# Patient Record
Sex: FEMALE | Race: WHITE | NOT HISPANIC OR LATINO | Employment: UNEMPLOYED | URBAN - METROPOLITAN AREA
[De-identification: names, ages, dates, MRNs, and addresses within clinical notes are randomized per-mention and may not be internally consistent; named-entity substitution may affect disease eponyms.]

---

## 2019-07-29 ENCOUNTER — APPOINTMENT (OUTPATIENT)
Dept: LAB | Facility: HOSPITAL | Age: 54
End: 2019-07-29

## 2019-07-29 ENCOUNTER — OFFICE VISIT (OUTPATIENT)
Dept: ORTHOPEDIC SURGERY | Facility: CLINIC | Age: 54
End: 2019-07-29

## 2019-07-29 VITALS — OXYGEN SATURATION: 98 % | HEIGHT: 67 IN | WEIGHT: 218.26 LBS | HEART RATE: 74 BPM | BODY MASS INDEX: 34.26 KG/M2

## 2019-07-29 DIAGNOSIS — G62.9 NEUROPATHY: ICD-10-CM

## 2019-07-29 DIAGNOSIS — I87.8 VENOUS STASIS: ICD-10-CM

## 2019-07-29 DIAGNOSIS — M79.672 FOOT PAIN, BILATERAL: Primary | ICD-10-CM

## 2019-07-29 DIAGNOSIS — M19.072 ARTHRITIS OF BOTH FEET: ICD-10-CM

## 2019-07-29 DIAGNOSIS — M19.071 ARTHRITIS OF BOTH FEET: ICD-10-CM

## 2019-07-29 DIAGNOSIS — M79.671 FOOT PAIN, BILATERAL: Primary | ICD-10-CM

## 2019-07-29 PROCEDURE — 99204 OFFICE O/P NEW MOD 45 MIN: CPT | Performed by: ORTHOPAEDIC SURGERY

## 2019-07-29 RX ORDER — FLUTICASONE PROPIONATE 50 MCG
SPRAY, SUSPENSION (ML) NASAL
COMMUNITY
Start: 2019-07-25

## 2019-07-29 RX ORDER — LOPERAMIDE HYDROCHLORIDE 2 MG/1
2 CAPSULE ORAL 4 TIMES DAILY PRN
COMMUNITY

## 2019-07-29 RX ORDER — FEXOFENADINE HYDROCHLORIDE 60 MG/1
60 TABLET, FILM COATED ORAL DAILY
COMMUNITY

## 2019-07-29 NOTE — PROGRESS NOTES
NEW PATIENT    Patient: Alysha eRese  : 1965    Primary Care Provider: Frederick Beck MD    Requesting Provider: As above    Pain of the Left Foot and Pain of the Right Foot      History    Chief Complaint: Bilateral foot pain    History of Present Illness: This is an extremely pleasant 54-year-old woman here for a second opinion regarding bilateral foot pain.  She has a history of some pain in the arches of her feet over the past 10 years.  Is gotten worse over the past several years.  She has 2 pair of orthotics, one is semi-custom and the other is custom, both of them have a hard plastic three-quarter length underlay.  She has x-rays from podiatry standing 3 views of both feet dated 2017 that I looked at, the arthritis in the midfoot is mild at that point, predominantly the medial naviculocuneiform joint, she has metatarsus adductus, some hammertoes.  She developed much more pain after going to a Evans[place] [employer] dance 2019 and dancing most of the evening.  The next day she had significant swelling in the foot and the knee, she was on crutches, she was in a boot, she had both anti-inflammatory medication and prednisone.  She had an MRI done that I looked at on 2019, showing significant midfoot abnormality, suggestion was it might be a Charcot joint.  It was treated at an orthopedics, they did not think that it was a Charcot joint.  She gradually was able to return to shoe wear.  She continues to have pain however.  She had an injection in the right foot in what sounds like the fourth and fifth TMT's that did not give her long-term relief.  She is going [place] of [place] and she would like to know if there is anything else I would suggest.  The feet feel worse with activity better with rest.  She does have some nighttime cramping in her calves.  She rates the current pain as 1-4 out of 10, aching and sharp.  She does not have any history of diabetes  that she is aware of, we will try to obtain the labs from Dr. Beck.  She is unaware of any history of neuropathy.    Current Outpatient Medications on File Prior to Visit   Medication Sig Dispense Refill   • ADVAIR DISKUS 250-50 MCG/DOSE DISKUS      • fexofenadine (ALLEGRA) 60 MG tablet Take 60 mg by mouth Daily.     • fluticasone (FLONASE) 50 MCG/ACT nasal spray      • loperamide (IMODIUM) 2 MG capsule Take 2 mg by mouth 4 (Four) Times a Day As Needed for Diarrhea.     • Omega-3 Fatty Acids (OMEGA 3 500 PO) Take  by mouth.       No current facility-administered medications on file prior to visit.       Allergies   Allergen Reactions   • Clindamycin/Lincomycin Rash      Past Medical History:   Diagnosis Date   • Asthma    • Osteoarthritis      Past Surgical History:   Procedure Laterality Date   • LEG SURGERY Left 1992    Vein Stripping      Family History   Problem Relation Age of Onset   • Hypertension Mother    • Stroke Father    • Hypertension Father    • Heart attack Father       Social History     Socioeconomic History   • Marital status:      Spouse name: Not on file   • Number of children: Not on file   • Years of education: Not on file   • Highest education level: Not on file   Tobacco Use   • Smoking status: Never Smoker   • Smokeless tobacco: Never Used   Substance and Sexual Activity   • Alcohol use: Yes     Comment: Socially    • Drug use: No        Review of Systems   Constitutional: Negative.    HENT: Positive for postnasal drip.    Eyes: Negative.    Respiratory: Negative.    Cardiovascular: Negative.    Gastrointestinal: Negative.    Endocrine: Negative.    Genitourinary: Negative.    Musculoskeletal: Positive for arthralgias, back pain, neck pain and neck stiffness.   Skin: Negative.    Allergic/Immunologic: Positive for environmental allergies.   Neurological: Positive for numbness.   Hematological: Bruises/bleeds easily.   Psychiatric/Behavioral: Negative.        The following  "portions of the patient's history were reviewed and updated as appropriate: allergies, current medications, past family history, past medical history, past social history, past surgical history and problem list.    Physical Exam:   Pulse 74   Ht 170.2 cm (67\")   Wt 99 kg (218 lb 4.1 oz)   SpO2 98%   BMI 34.18 kg/m²   GENERAL: Body habitus: obese    Lower extremity edema: Right: 2+ pitting; Leftt: 2+ pitting    Varicose veins:  Right: moderate and venous stasis pigment; Left: moderate and venous stasis pigment    Gait: antalgic     Mental Status:  awake and alert; oriented to person, place, and time    Voice:  clear  SKIN:  venous stasis pigment    Hair Growth:  Right:normal; Left:  normal  NAILS: Toenails: Right great toenail has been removed  HEENT: Head: Normocephalic, atraumatic,  without obvious abnormality.  eye: normal external eye, no icterus  ears: normal external ears  nose: normal external nose  pharynx: dental hygiene adequate  PULM:  Repiratory effort normal  CV:  Dorsalis Pedis:  Right: 2+; Left:2+    Posterior Tibial: Right:2+; Left:2+    Capillary Refill:  Brisk  MSK:  Hand:right handed and mild arthritis, CMC      Tibia:  Right:  tender over subcutaneous border; Left:  tender over subcutaneous border      Ankle:  Right: non tender, ROM  normal and symmetric and motor function  normal; Left:  non tender, ROM  normal and symmetric and motor function  normal      Foot:  Right:  Fairly significant metatarsus adductus, tender across the TMT's and naviculocuneiform joints, prominent osteophytes, moderate splaying of the toes nontender; Left:  Fairly significant metatarsus adductus, tender across the TMT's and naviculocuneiform joints, prominent osteophytes, moderate splaying of the toes nontender      NEURO: Heel Walking:  Right:  normal; Left:  normal    Toe Walking:  Right:  limited ability, painful; Left:  limited ability, painful     [place]-Trace 5.07 monofilament test: patchy decrease    Lower " extremity sensation: diminished     Reflexes:  Biceps:  Right:  1+; Left:  1+           Quads:  Right:  2+; Left:  2+           Ankle:  Right:  1+; Left:  1+      Calf Atrophy:none    Motor Function: all 5/5         Medical Decision Making    Data Review:   ordered and reviewed x-rays today, reviewed prior lab results, reviewed radiology images, reviewed radiology results and reviewed outside records I looked at the outside films and an MRI as above.  We were able to obtain lab studies from Dr. Beck.  On 6/13/2019 her fasting glucose was 88, mildly low hematocrit, normal TSH.  Other labs involved cholesterol etc.  The    Assessment and Plan/ Diagnosis/Treatment options:   1. Arthritis of both feet  She does have significant arthritic change in the midfoot bilaterally, it is a significant progression compared to the x-rays from 2017.  It does make me wonder whether she has had Charcot joints.  The pain and swelling she had in February March of this year, and the right foot, would be consistent with a Charcot joint.  On exam I found she had a patchy decrease to the [place] fiber.  I would recommend that she see a neurologist for evaluation.  We will also send some lab studies today.  With respect to the midfoot arthritis I would recommend new custom orthotics.  We also discussed new shoe wear, her shoes are worn out laterally due to the metatarsus adductus.  For her hiking trip I would suggest that she take Aleve or Advil or the Voltaren with her.  With increased activity and pain I would suggest icing.  I think the orthotics and new shoes will help.  I also showed her how to change the laces in her shoes to avoid pressure over the arthritic joints.  I will see her in 3 months.  Standing 2 views of both feet at that time    2. Neuropathy  As above I am concerned she has an underlying neuropathy.  We will make her an appointment for consultation with neurology.  I will send some screening lab studies  today.  - Hemoglobin A1c; Future  - Rheumatoid Factor; Future  - Sedimentation Rate; Future  - Uric Acid; Future  - C-reactive Protein; Future  - EJ; Future  - Ambulatory Referral to Neurology    3. Venous stasis  She has significant venous stasis bilaterally, that causes increase in pain, it causes cramping and stiffness, I would definitely recommend support stockings daily.  I explained edema and venous stasis to the patient, and the often hereditary nature of the problem, and the contribution of weight, trauma, etc. I explained how they cause edema (due to gravity, etc) I explained how they can lead to ulceration.  I explained how they can cause pain, stiffness, aching, cramping, etc. I explained that it is a very very common problem, so common that even [employer] markets compression stockings.  I recommend wearing support stockings (compression stockings) daily, we discussed what type and where to find them

## 2019-08-19 ENCOUNTER — OFFICE VISIT (OUTPATIENT)
Dept: NEUROLOGY | Facility: CLINIC | Age: 54
End: 2019-08-19

## 2019-08-19 ENCOUNTER — APPOINTMENT (OUTPATIENT)
Dept: LAB | Facility: HOSPITAL | Age: 54
End: 2019-08-19

## 2019-08-19 VITALS
HEART RATE: 68 BPM | DIASTOLIC BLOOD PRESSURE: 90 MMHG | HEIGHT: 67 IN | WEIGHT: 22 LBS | SYSTOLIC BLOOD PRESSURE: 138 MMHG | OXYGEN SATURATION: 96 % | BODY MASS INDEX: 3.45 KG/M2

## 2019-08-19 DIAGNOSIS — M79.672 PAIN IN BOTH FEET: Primary | ICD-10-CM

## 2019-08-19 DIAGNOSIS — M79.671 PAIN IN BOTH FEET: Primary | ICD-10-CM

## 2019-08-19 LAB
FOLATE SERPL-MCNC: >20 NG/ML (ref 4.78–24.2)
VIT B12 BLD-MCNC: 542 PG/ML (ref 211–946)

## 2019-08-19 PROCEDURE — 99203 OFFICE O/P NEW LOW 30 MIN: CPT | Performed by: NURSE PRACTITIONER

## 2019-08-19 PROCEDURE — 82784 ASSAY IGA/IGD/IGG/IGM EACH: CPT | Performed by: NURSE PRACTITIONER

## 2019-08-19 PROCEDURE — 84155 ASSAY OF PROTEIN SERUM: CPT | Performed by: NURSE PRACTITIONER

## 2019-08-19 PROCEDURE — 36415 COLL VENOUS BLD VENIPUNCTURE: CPT | Performed by: NURSE PRACTITIONER

## 2019-08-19 PROCEDURE — 82746 ASSAY OF FOLIC ACID SERUM: CPT | Performed by: NURSE PRACTITIONER

## 2019-08-19 PROCEDURE — 84165 PROTEIN E-PHORESIS SERUM: CPT | Performed by: NURSE PRACTITIONER

## 2019-08-19 PROCEDURE — 83921 ORGANIC ACID SINGLE QUANT: CPT | Performed by: NURSE PRACTITIONER

## 2019-08-19 PROCEDURE — 86334 IMMUNOFIX E-PHORESIS SERUM: CPT | Performed by: NURSE PRACTITIONER

## 2019-08-19 PROCEDURE — 82607 VITAMIN B-12: CPT | Performed by: NURSE PRACTITIONER

## 2019-08-19 RX ORDER — MELOXICAM 15 MG/1
TABLET ORAL
COMMUNITY
Start: 2019-08-02

## 2019-08-19 RX ORDER — CETIRIZINE HYDROCHLORIDE 10 MG/1
10 TABLET ORAL DAILY
COMMUNITY

## 2019-08-19 NOTE — PROGRESS NOTES
"Subjective:     Patient ID: Alysha Reese is a 54 y.o. female.    CC:   Chief Complaint   Patient presents with   • Peripheral Neuropathy       HPI:   History of Present Illness     Ms Reese is a 55 y/o pt here today as a new patient for evaluation of bilateral foot pain.  She was referred by Dr. Decker.  She has a long history of bilateral foot pain dating back about 10 years.  She reports that she was initially diagnosed with plantars fasciitis.  She states that around 8 to 9 years ago her feet went from a size 8-1/2 to a size 10 after her \"arches collapsed\".  She tells me that she has slowly began to notice deviation of her toes outward over several years.  She is been told in the past she has Charcot foot.  She states that she has struggled with significant discomfort in her feet off and on for the past 10 years.  She states that on this past Evans's Day that she danced quite a bit and she has had problems with her feet since.  She reports that for approximately 1 week after Evans's Day both feet were very achy and painful across the top of her foot, after about a week the left foot pain subsided and she has continued pain in the right foot.  She also reports that her right foot seems to swell across the top of her foot.  She complains of sharp pain across the metatarsal heads along the lateral portion of her right foot as well in the peroneal tendon area.   She has x-rays from podiatry standing 3 views of both feet dated 2/6/2017.  Dr Decker read xrays as mild arthritis in the midfoot predominantly the medial naviculocuneiform joint, she has metatarsus adductus, some hammertoes.  In the past she was on crutches,  in a boot and she had both anti-inflammatory medication and prednisone.  She had an MRI March 2019 showing significant midfoot abnormality, suggestive of  Charcot joint.  She has seen orthopedics and podiatry in the past and had an injection in the right foot which was ineffective.    She " reportedly had custom insoles prior to seeing Dr. Decker, since seeing her she was given new insoles and reports this is significantly improved her foot pain.  She denies any numbness and tingling of her feet, she denies any bruising type sensation of the plantar aspect of the foot as well.  Dr. Decker did obtain some labs, have these for review today, hemoglobin A1c was 5.7%, she did have some macrocytosis, B12 level was not drawn.  EJ, rheumatoid factor and sed rate normal, CRP was very slightly elevated by 10th of a point.  She denies any other neurological symptoms, dizziness, headache, vision changes or any paresthesias.  The following portions of the patient's history were reviewed and updated as appropriate: allergies, current medications, past family history, past medical history, past social history, past surgical history and problem list.    Past Medical History:   Diagnosis Date   • Asthma    • Osteoarthritis        Past Surgical History:   Procedure Laterality Date   • LEG SURGERY Left 1992    Vein Stripping        Social History     Socioeconomic History   • Marital status:      Spouse name: Not on file   • Number of children: Not on file   • Years of education: Not on file   • Highest education level: Not on file   Tobacco Use   • Smoking status: Never Smoker   • Smokeless tobacco: Never Used   Substance and Sexual Activity   • Alcohol use: Yes     Comment: Socially    • Drug use: No       Family History   Problem Relation Age of Onset   • Hypertension Mother    • Stroke Father    • Hypertension Father    • Heart attack Father         Review of Systems   Constitutional: Negative.    HENT: Negative.    Eyes: Negative.    Respiratory: Negative.    Cardiovascular: Negative.    Gastrointestinal: Negative.    Endocrine: Negative.    Genitourinary: Negative.    Musculoskeletal: Negative.         Foot pain r>l   Skin: Negative.    Neurological: Negative.  Negative for dizziness, tremors, seizures,  syncope, facial asymmetry, speech difficulty, weakness, light-headedness, numbness and headaches.   Hematological: Negative.    Psychiatric/Behavioral: Negative.         Objective:    Neurologic Exam     Mental Status   Oriented to person, place, and time.   Attention: normal. Concentration: normal.   Speech: speech is normal   Level of consciousness: alert  Knowledge: consistent with education.   Able to read. Able to write. Normal comprehension.     Cranial Nerves   Cranial nerves II through XII intact.     CN II   Visual fields full to confrontation.   Right visual field deficit: none  Left visual field deficit: none     CN III, IV, VI   Pupils are equal, round, and reactive to light.  Extraocular motions are normal.   Right pupil: Shape: regular. Reactivity: brisk. Consensual response: intact. Accommodation: intact.   Left pupil: Shape: regular. Reactivity: brisk. Consensual response: intact. Accommodation: intact.   CN III: no CN III palsy  CN VI: no CN VI palsy  Nystagmus: none   Upgaze: normal  Downgaze: normal    CN V   Facial sensation intact.     CN VII   Facial expression full, symmetric.     CN VIII   CN VIII normal.     CN IX, X   CN IX normal.   CN X normal.     CN XI   CN XI normal.     CN XII   CN XII normal.     Motor Exam   Muscle bulk: normal  Overall muscle tone: normal  Right arm tone: normal  Left arm tone: normal  Right arm pronator drift: absent  Left arm pronator drift: absent  Right leg tone: normal  Left leg tone: normal    Strength   Strength 5/5 throughout.     Sensory Exam   Right leg light touch: normal  Left leg light touch: normal  Right leg vibration: decreased from ankle  Left leg vibration: normal  Proprioception normal.   Right leg pinprick: normal  Left leg pinprick: normal  Normal sharp sensation bilateral lower extremities  Normal cold sensation bilateral lower extremities  Pt has decreased sensation to monofilament testing randomly on the right foot, no abnormality on the  left     Gait, Coordination, and Reflexes     Gait  Gait: wide-based (Antalgic gait)    Coordination   Romberg: negative  Finger to nose coordination: normal  Heel to shin coordination: normal  Tandem walking coordination: normal    Tremor   Resting tremor: absent  Intention tremor: absent  Action tremor: absent    Reflexes   Reflexes 2+ except as noted.   Right plantar: normal  Left plantar: normal  Right Ornelas: absent  Left Ornelas: absent      Physical Exam   Constitutional: She is oriented to person, place, and time. She appears well-developed and well-nourished. No distress.   HENT:   Head: Normocephalic and atraumatic.   Eyes: Conjunctivae and EOM are normal. Pupils are equal, round, and reactive to light. No scleral icterus.   Neck: Normal range of motion. Neck supple.   Pulmonary/Chest: Effort normal. No respiratory distress.   Musculoskeletal:   Splaying of the toes bilaterally   Neurological: She is alert and oriented to person, place, and time. She has normal strength. She has a normal Finger-Nose-Finger Test, a normal Heel to Shin Test, a normal Romberg Test and a normal Tandem Gait Test.   Skin: Skin is warm.   Psychiatric: She has a normal mood and affect. Her speech is normal and behavior is normal. Judgment and thought content normal.   Vitals reviewed.      Assessment/Plan:       Alysha was seen today for peripheral neuropathy.    Diagnoses and all orders for this visit:    Pain in both feet  -     Vitamin B12  -     Folate  -     CHRISTINE + PE  -     Methylmalonic Acid, Serum  -     EMG & Nerve Conduction Test    Ms. Reese is here today for evaluation of neuropathy, questionable Charcot foot.  She reports long history of bilateral foot pain, she complains of pain on the dorsal aspect of the foot across the top bilaterally.  She also has pain along the lateral aspect of the right foot which is tender to touch.  She is been told by numerous podiatrist and orthopedic doctor she has significant arthritis  of the feet.  She was seen here by orthopedics for evaluation for neuropathy.  Hemoglobin A1c was recently drawn found to be 5.7%.  She does have mild macrocytosis on CBC, will check B12 and folate as well as immunofixation studies and methylmalonic acid.  I have also scheduled her for a nerve conduction study of the bilateral lower extremities.  She wishes to wait on any other testing at this time.  She was offered a trial of gabapentin which she is declined today.  She does report that she has had significant improvement in the discomfort in her feet since changing to her new orthotics.  She is also using compression stockings.  She does have evidence of venous insufficiency, she has quite a bit of varicosities in her legs, she does have 2+ pulses bilateral lower extremities with brisk capillary refill.  I would like to see her back after nerve conduction studies are performed.  If she does decide she would like to trial gabapentin for pain she will let me know, controlled substance agreement has been signed in the event she chooses to start medication.  Reviewed medications, potential side effects and signs and symptoms to report. Discussed risk versus benefits of treatment plan with patient and/or family-including medications, labs and radiology that may be ordered. Addressed questions and concerns during visit. Patient and/or family verbalized understanding and agree with plan.  During this visit the following were done:  Labs Reviewed [x]    Labs Ordered []    Radiology Reports Reviewed [x]    Radiology Ordered []    PCP Records Reviewed []    Referring Provider Records Reviewed [x]    ER Records Reviewed []    Hospital Records Reviewed []    History Obtained From Family []    Radiology Images Reviewed []    Other Reviewed []    Records Requested []    EMR Dragon/Transcription Disclaimer:  Much of this encounter note is an electronic transcription of spoken language to printed text. Electronic transcription of  spoken language may permit erroneous words or phrases to be inadvertently transcribed. Although I have reviewed the note for such errors, some may still exist in this documentation.             Maliha Hoffmann, APRN  8/19/2019

## 2019-08-21 LAB
ALBUMIN SERPL-MCNC: 3.9 G/DL (ref 2.9–4.4)
ALBUMIN/GLOB SERPL: 1.6 {RATIO} (ref 0.7–1.7)
ALPHA1 GLOB FLD ELPH-MCNC: 0.2 G/DL (ref 0–0.4)
ALPHA2 GLOB SERPL ELPH-MCNC: 0.6 G/DL (ref 0.4–1)
B-GLOBULIN SERPL ELPH-MCNC: 0.9 G/DL (ref 0.7–1.3)
GAMMA GLOB SERPL ELPH-MCNC: 0.9 G/DL (ref 0.4–1.8)
GLOBULIN SER CALC-MCNC: 2.6 G/DL (ref 2.2–3.9)
IGA SERPL-MCNC: 122 MG/DL (ref 87–352)
IGG SERPL-MCNC: 905 MG/DL (ref 700–1600)
IGM SERPL-MCNC: 92 MG/DL (ref 26–217)
INTERPRETATION SERPL IEP-IMP: NORMAL
Lab: NORMAL
M-SPIKE: NORMAL G/DL
PROT SERPL-MCNC: 6.5 G/DL (ref 6–8.5)

## 2019-08-23 ENCOUNTER — TELEPHONE (OUTPATIENT)
Dept: NEUROLOGY | Facility: CLINIC | Age: 54
End: 2019-08-23

## 2019-08-23 LAB
Lab: NORMAL
METHYLMALONATE SERPL-SCNC: 185 NMOL/L (ref 0–378)

## 2019-08-23 NOTE — TELEPHONE ENCOUNTER
----- Message from JESSY Mustafa sent at 8/23/2019  9:43 AM EDT -----  Please let her know her labs were stable, keep follow-up

## 2019-09-26 ENCOUNTER — HOSPITAL ENCOUNTER (OUTPATIENT)
Dept: NEUROLOGY | Facility: HOSPITAL | Age: 54
Discharge: HOME | End: 2019-09-26
Admitting: NURSE PRACTITIONER

## 2019-09-26 PROCEDURE — 95912 NRV CNDJ TEST 11-12 STUDIES: CPT

## 2019-09-26 PROCEDURE — 95911 NRV CNDJ TEST 9-10 STUDIES: CPT

## 2019-09-26 PROCEDURE — 95886 MUSC TEST DONE W/N TEST COMP: CPT

## 2019-09-30 ENCOUNTER — TELEPHONE (OUTPATIENT)
Dept: NEUROLOGY | Facility: CLINIC | Age: 54
End: 2019-09-30

## 2019-09-30 NOTE — TELEPHONE ENCOUNTER
----- Message from JESSY Mustafa sent at 9/27/2019  4:37 PM EDT -----  Nerve/muscle testing showed very mild neuropathy, will discuss further at follow up

## 2019-10-01 ENCOUNTER — OFFICE VISIT (OUTPATIENT)
Dept: NEUROLOGY | Facility: CLINIC | Age: 54
End: 2019-10-01

## 2019-10-01 VITALS
BODY MASS INDEX: 34.37 KG/M2 | WEIGHT: 219 LBS | SYSTOLIC BLOOD PRESSURE: 128 MMHG | OXYGEN SATURATION: 94 % | DIASTOLIC BLOOD PRESSURE: 88 MMHG | HEART RATE: 111 BPM | HEIGHT: 67 IN

## 2019-10-01 DIAGNOSIS — M79.671 PAIN IN BOTH FEET: Primary | ICD-10-CM

## 2019-10-01 DIAGNOSIS — M79.672 PAIN IN BOTH FEET: Primary | ICD-10-CM

## 2019-10-01 PROCEDURE — 99212 OFFICE O/P EST SF 10 MIN: CPT | Performed by: NURSE PRACTITIONER

## 2019-10-01 NOTE — PROGRESS NOTES
"Subjective:     Patient ID: Alysha Reese is a 54 y.o. female.    CC:   Chief Complaint   Patient presents with   • Pain       HPI:   History of Present Illness     Ms Reese is her for follow up.  I first saw her for evaluation of bilateral foot pain.  She was referred by Dr. Decker.  She has a long history of bilateral foot pain dating back about 10 years.  She reported that she was initially diagnosed with plantars fasciitis.  She stated that around 8 to 9 years ago her feet went from a size 8-1/2 to a size 10 after her \"arches collapsed\".  She told me that she has slowly began to notice deviation of her toes outward over several years.  She has been told in the past she has Charcot foot.  She stated that she has struggled with significant discomfort in her feet off and on for the past 10 years.  She reported on this past Valentine's Day that she danced quite a bit and she has had problems with her feet since.  for approximately 1 week after Valentine's Day both feet were very achy and painful across the top of her foot, after about a week the left foot pain subsided and she has continued pain in the right foot.  She also reports that her right foot seems to swell across the top of her foot.  She complains of sharp pain across the metatarsal heads along the lateral portion of her right foot as well in the peroneal tendon area.   She had x-rays from podiatry standing 3 views of both feet dated 2/6/2017.  Dr Decker read xrays as mild arthritis in the midfoot predominantly the medial naviculocuneiform joint, she has metatarsus adductus, some hammertoes.  In the past she was on crutches,  in a boot and she had both anti-inflammatory medication and prednisone.  She had an MRI March 2019 showing significant midfoot abnormality, suggestive of  Charcot joint (report not available)  She has seen orthopedics and podiatry in the past and had an injection in the right foot which was ineffective.    She reportedly had custom " insoles prior to seeing Dr. Decker, since seeing her she was given new insoles and reports this is significantly improved her foot pain.  She denies any numbness and tingling of her feet, she denies any bruising type sensation of the plantar aspect of the foot as well.  Dr. Decker did obtain some labs, have these for review today, hemoglobin A1c was 5.7%, she did have some macrocytosis, B12 level was not drawn.  EJ, rheumatoid factor and sed rate normal, CRP was very slightly elevated by 10th of a point.  She denies any other neurological symptoms, dizziness, headache, vision changes or any paresthesias.    After last visit nerve conduction study was ordered, this was performed and read by Dr. Cady Larson of our neurologist.  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  Lab work is been unrevealing including hemoglobin A1c, TSH, methylmalonic acid, folate, B12 and immunofixation studies which were all normal.  She continues to complain of pain and arthritic discomfort and occasional swelling in her feet.  The following portions of the patient's history were reviewed and updated as appropriate: allergies, current medications, past family history, past medical history, past social history, past surgical history and problem list.    Past Medical History:   Diagnosis Date   • Asthma    • Osteoarthritis        Past Surgical History:   Procedure Laterality Date   • LEG SURGERY Left 1992    Vein Stripping        Social History     Socioeconomic History   • Marital status:      Spouse name: Not on file   • Number of children: Not on file   • Years of education: Not on file   • Highest education level: Not on file   Tobacco Use   • Smoking status: Never Smoker   • Smokeless tobacco: Never Used   Substance and Sexual Activity   • Alcohol use: Yes     Comment: Socially    • Drug use: No       Family History   Problem Relation Age of Onset   • Hypertension Mother    • Stroke Father    • Hypertension  Father    • Heart attack Father         Review of Systems   Constitutional: Negative.    HENT: Negative.    Eyes: Negative.    Respiratory: Negative.    Cardiovascular: Negative.    Gastrointestinal: Negative.    Endocrine: Negative.    Genitourinary: Negative.    Musculoskeletal: Negative.         Foot pain   Skin: Negative.    Allergic/Immunologic: Negative.    Neurological: Negative.    Hematological: Negative.    Psychiatric/Behavioral: Negative.         Objective:    Neurologic Exam     Mental Status   Oriented to person, place, and time.   Attention: normal. Concentration: normal.   Speech: speech is normal   Level of consciousness: alert  Knowledge: consistent with education.   Able to read. Able to write. Normal comprehension.     Cranial Nerves   Cranial nerves II through XII intact.     CN III, IV, VI   Extraocular motions are normal.     Motor Exam   Muscle bulk: normal    Strength   Strength 5/5 throughout.     Sensory Exam   Patchy decreased pinprick sensation on right foot, more pronounced along lateral foot and dorsum of foot  Pulses intact     Gait, Coordination, and Reflexes     Gait  Gait: normal    Reflexes   Reflexes 2+ except as noted.       Physical Exam   Constitutional: She is oriented to person, place, and time. She appears well-developed and well-nourished. No distress.   HENT:   Head: Normocephalic.   Eyes: EOM are normal.   Neurological: She is alert and oriented to person, place, and time. She has normal strength. Gait normal.   Psychiatric: Her speech is normal.   Vitals reviewed.      Assessment/Plan:       Alysha was seen today for pain.    Diagnoses and all orders for this visit:    Pain in both feet  Comments:  multifactoral exceedingly mild peripheral neuropathy, certainly not extent to contribute to Charcot, discussed with Dr. Lazar      Ms. Reese is here today for follow-up on her nerve conduction studies.  Report showed exceedingly mild peripheral neuropathy, not to the extent  "that would contribute to Charcot foot, I did discuss this with Dr. Lazar who performed nerve conduction studies.  She does have evidence of arthritis on x-ray and on exam.  I have sent for her MRI report of the foot that was done in [place].  In regards to the neuropathy this appears to be idiopathic, labs have been unremarkable.  She declines any further imaging such as prior lumbar spine however there is no evidence of radiculopathy on nerve conduction study.  Recommend that she continue her orthotics and follow-up with her podiatrist as her c/o pain is more arthritic as she c/o \"popping' in bones at times.  Will discuss also with Dr. Stratton, if he has any further recommendations I will notify her.  Reviewed medications, potential side effects and signs and symptoms to report. Discussed risk versus benefits of treatment plan with patient and/or family-including medications, labs and radiology that may be ordered. Addressed questions and concerns during visit. Patient and/or family verbalized understanding and agree with plan.  EMR Dragon/Transcription Disclaimer:  Much of this encounter note is an electronic transcription of spoken language to printed text. Electronic transcription of spoken language may permit erroneous words or phrases to be inadvertently transcribed. Although I have reviewed the note for such errors, some may still exist in this documentation.           Maliha Hoffmann, APRN  10/1/2019        "

## 2019-10-22 ENCOUNTER — TELEPHONE (OUTPATIENT)
Dept: ORTHOPEDIC SURGERY | Facility: CLINIC | Age: 54
End: 2019-10-22

## 2019-10-22 NOTE — TELEPHONE ENCOUNTER
DR. LINDER RECOMMENDED COMPRESSION SOCKS THE LAST TIME SHE WAS SEEN, SAID SHE IS ONLY SEEING 20-30 SOCKS ONLINE AND WANTED TO KNOW IF THOSE WERE OK.

## 2019-10-28 ENCOUNTER — OFFICE VISIT (OUTPATIENT)
Dept: ORTHOPEDIC SURGERY | Facility: CLINIC | Age: 54
End: 2019-10-28

## 2019-10-28 VITALS — OXYGEN SATURATION: 99 % | BODY MASS INDEX: 34.37 KG/M2 | HEART RATE: 87 BPM | WEIGHT: 219 LBS | HEIGHT: 67 IN

## 2019-10-28 DIAGNOSIS — M19.072 ARTHRITIS OF BOTH FEET: Primary | ICD-10-CM

## 2019-10-28 DIAGNOSIS — G62.9 NEUROPATHY: ICD-10-CM

## 2019-10-28 DIAGNOSIS — I87.8 VENOUS STASIS: ICD-10-CM

## 2019-10-28 DIAGNOSIS — M19.071 ARTHRITIS OF BOTH FEET: Primary | ICD-10-CM

## 2019-10-28 PROCEDURE — 99213 OFFICE O/P EST LOW 20 MIN: CPT | Performed by: ORTHOPAEDIC SURGERY

## 2019-10-28 NOTE — PROGRESS NOTES
ESTABLISHED PATIENT    Patient: Alysha Reese  : 1965    Primary Care Provider: Frederick Beck MD    Requesting Provider: As above    Follow-up (3 month follow up; Arthritis of both feet)      History    Chief Complaint: Bilateral foot pain    History of Present Illness: She returns for follow-up after seeing neurology.  She had EMGs and they show a mild peripheral neuropathy.  She has the orthotics and they are helpful.  She notes they do not make the pain completely go away.  We talked about trying over-the-counter lidocaine patches.  She has changed the way she laces her shoes.  She is wearing support stockings.  She reports she still has a lot of pain at the end of a long day, and nighttime neuropathic pain sometimes.  She reports that all her lab studies have been unremarkable, and I looked at them in the system.  There is no obvious source for the neuropathy.  I looked at the nerve conduction studies/EMGs.  I looked at the neurology notes.    Current Outpatient Medications on File Prior to Visit   Medication Sig Dispense Refill   • ADVAIR DISKUS 250-50 MCG/DOSE DISKUS      • cetirizine (zyrTEC) 10 MG tablet Take 10 mg by mouth Daily.     • fexofenadine (ALLEGRA) 60 MG tablet Take 60 mg by mouth Daily.     • fluticasone (FLONASE) 50 MCG/ACT nasal spray      • loperamide (IMODIUM) 2 MG capsule Take 2 mg by mouth 4 (Four) Times a Day As Needed for Diarrhea.     • meloxicam (MOBIC) 15 MG tablet      • Omega-3 Fatty Acids (OMEGA 3 500 PO) Take  by mouth.       No current facility-administered medications on file prior to visit.       Allergies   Allergen Reactions   • Clindamycin/Lincomycin Rash      Past Medical History:   Diagnosis Date   • Asthma    • Osteoarthritis      Past Surgical History:   Procedure Laterality Date   • LEG SURGERY Left     Vein Stripping      Family History   Problem Relation Age of Onset   • Hypertension Mother    • Stroke Father    • Hypertension Father    • Heart  "attack Father       Social History     Socioeconomic History   • Marital status:      Spouse name: Not on file   • Number of children: Not on file   • Years of education: Not on file   • Highest education level: Not on file   Tobacco Use   • Smoking status: Never Smoker   • Smokeless tobacco: Never Used   Substance and Sexual Activity   • Alcohol use: Yes     Comment: Socially    • Drug use: No   • Sexual activity: Defer        Review of Systems   Constitutional: Negative.    HENT: Negative.    Eyes: Negative.    Respiratory: Negative.    Cardiovascular: Negative.    Gastrointestinal: Negative.    Endocrine: Negative.    Genitourinary: Negative.    Musculoskeletal: Positive for arthralgias.   Skin: Negative.    Allergic/Immunologic: Negative.    Neurological: Negative.    Hematological: Negative.    Psychiatric/Behavioral: Negative.        The following portions of the patient's history were reviewed and updated as appropriate: allergies, current medications, past family history, past medical history, past social history, past surgical history and problem list.    Physical Exam:   Pulse 87   Ht 170.2 cm (67.01\")   Wt 99.3 kg (219 lb)   SpO2 99%   BMI 34.29 kg/m²   GENERAL: Body habitus: obese    Lower extremity edema: Left: trace; Right: trace    Gait: antalgic     Mental Status:  awake and alert; oriented to person, place, and time  MSK:  Tibia:  Right:  non tender; Left:  non tender        Ankle:  Right: non tender; Left:  non tender        Foot:  Right:  Tender across the tarsometatarsal joints; Left:  Tender across the tarsometatarsal joints no change in the toe deformities and metatarsus adductus on either side    NEURO Sensation:  diminished    Medical Decision Making    Data Review:   reviewed prior lab results and reviewed outside records    Assessment/Plan/Diagnosis/Treatment Options:   1. Arthritis of both feet  At present she thinks her symptoms are reasonably controlled with the current " measures.  As above we talked about adding topical lidocaine.  I also recommended Voltaren gel and send a prescription to her pharmacy.  We talked about injection, but she did not have much benefit from that when it was done at another institution.  We also talked about weight loss, that can help the midfoot joints.  She does not think she has enough pain for surgery.  We will leave her follow-up open-ended, I will be happy to see her at any time  - XR Foot 2 View Bilateral    2. Neuropathy  Mild but present    3. Venous stasis  Continue to wear support stockings

## 2020-12-14 ENCOUNTER — OFFICE VISIT (OUTPATIENT)
Dept: ORTHOPEDIC SURGERY | Facility: CLINIC | Age: 55
End: 2020-12-14

## 2020-12-14 VITALS — BODY MASS INDEX: 38.64 KG/M2 | OXYGEN SATURATION: 99 % | WEIGHT: 246.2 LBS | HEIGHT: 67 IN | HEART RATE: 86 BPM

## 2020-12-14 DIAGNOSIS — G62.9 NEUROPATHY: ICD-10-CM

## 2020-12-14 DIAGNOSIS — M19.071 ARTHRITIS OF BOTH FEET: Primary | ICD-10-CM

## 2020-12-14 DIAGNOSIS — M19.072 ARTHRITIS OF BOTH FEET: Primary | ICD-10-CM

## 2020-12-14 DIAGNOSIS — I87.8 VENOUS STASIS: ICD-10-CM

## 2020-12-14 PROCEDURE — 99213 OFFICE O/P EST LOW 20 MIN: CPT | Performed by: ORTHOPAEDIC SURGERY

## 2020-12-14 RX ORDER — TRIAMTERENE AND HYDROCHLOROTHIAZIDE 37.5; 25 MG/1; MG/1
1 TABLET ORAL DAILY
COMMUNITY
Start: 2020-11-20

## 2020-12-14 NOTE — PROGRESS NOTES
ESTABLISHED PATIENT    Patient: Alysha Reese  : 1965    Primary Care Provider: Frederick Beck MD    Requesting Provider: As above    Follow-up (13 months- arthritis of both feet)      History    Chief Complaint: Right foot blistering    History of Present Illness: She returns with a new problem.  She still likes the orthotics, she also likes the compression socks.  The new problem is that when she goes hiking the next day she developed some blistering and superficial skin slough on the right foot.  She has had the orthotic adjusted and that has not changed it.  She tried different types of socks.  She reports that she notices some burning before the skin sloughs, but otherwise she has not had any significant pain.  She has not had any open wound.  It is just the very superficial layer of keratin that comes off.    Current Outpatient Medications on File Prior to Visit   Medication Sig Dispense Refill   • ADVAIR DISKUS 250-50 MCG/DOSE DISKUS      • Cholecalciferol (VITAMIN D3 PO) Take  by mouth.     • diclofenac (VOLTAREN) 1 % gel gel Apply 4 g topically to the appropriate area as directed 4 (Four) Times a Day As Needed (foot arthritis). 5 tube 5   • fexofenadine (ALLEGRA) 60 MG tablet Take 60 mg by mouth Daily.     • loperamide (IMODIUM) 2 MG capsule Take 2 mg by mouth 4 (Four) Times a Day As Needed for Diarrhea.     • meloxicam (MOBIC) 15 MG tablet      • Omega-3 Fatty Acids (OMEGA 3 500 PO) Take  by mouth.     • triamterene-hydrochlorothiazide (MAXZIDE-25) 37.5-25 MG per tablet Take 1 tablet by mouth Daily.     • cetirizine (zyrTEC) 10 MG tablet Take 10 mg by mouth Daily.     • fluticasone (FLONASE) 50 MCG/ACT nasal spray        No current facility-administered medications on file prior to visit.       Allergies   Allergen Reactions   • Clindamycin/Lincomycin Rash      Past Medical History:   Diagnosis Date   • Asthma    • Osteoarthritis      Past Surgical History:   Procedure Laterality Date   •  "LEG SURGERY Left 1992    Vein Stripping      Family History   Problem Relation Age of Onset   • Hypertension Mother    • Stroke Father    • Hypertension Father    • Heart attack Father       Social History     Socioeconomic History   • Marital status:      Spouse name: Not on file   • Number of children: Not on file   • Years of education: Not on file   • Highest education level: Not on file   Tobacco Use   • Smoking status: Never Smoker   • Smokeless tobacco: Never Used   Substance and Sexual Activity   • Alcohol use: Yes     Comment: Socially    • Drug use: No   • Sexual activity: Defer        Review of Systems   Constitutional: Negative.    HENT: Negative.    Eyes: Negative.    Respiratory: Negative.    Cardiovascular: Negative.    Gastrointestinal: Negative.    Endocrine: Negative.    Genitourinary: Negative.    Musculoskeletal: Positive for arthralgias.   Skin: Negative.    Allergic/Immunologic: Negative.    Neurological: Negative.    Hematological: Negative.    Psychiatric/Behavioral: Negative.        The following portions of the patient's history were reviewed and updated as appropriate: allergies, current medications, past family history, past medical history, past social history, past surgical history and problem list.    Physical Exam:   Pulse 86   Ht 170.2 cm (67.01\")   Wt 112 kg (246 lb 3.2 oz)   SpO2 99%   BMI 38.55 kg/m²   GENERAL: Body habitus: obese    Lower extremity edema: Left: trace; Right: trace    Gait: normal     Mental Status:  awake and alert; oriented to person, place, and time  MSK:  Tibia:  Right:  non tender; Left:  non tender        Ankle:  Right: non tender; Left:  non tender        Foot:  Right:  No tenderness in the right foot no change in the metatarsus adductus and mild lateral overload, no open wounds, there is loose superficial skin coming off the plantar lateral aspect of the foot, but no open wounds, there is slight tiny round punctate areas that suggest this might " be a fungus like athlete's foot; Left:  non tender    NEURO Sensation:  diminished    Medical Decision Making    Data Review:   ordered and reviewed x-rays today    Assessment/Plan/Diagnosis/Treatment Options:   1. Arthritis of both feet  Radiographs are unchanged, significant midfoot arthritis  - XR Foot 2 View Bilateral    2. Neuropathy  No change in neuropathy, no open wounds from the skin blistering, we talked about various things that might help.  One might be having the other side of the orthotic lowered a little bit.  I also showed her how to use moleskin on the orthotic.  Also in case this is any type of skin fungus I suggested she try some of the over-the-counter remedies just to see if that makes a difference.  If she develops no open wounds she should definitely be nonweightbearing and come back in.  Otherwise I will see her anytime    3. Venous stasis  She is not wearing the compression socks        Malka Decker MD

## 2023-02-23 ENCOUNTER — TELEPHONE (OUTPATIENT)
Dept: ORTHOPEDIC SURGERY | Facility: CLINIC | Age: 58
End: 2023-02-23

## 2023-02-23 NOTE — TELEPHONE ENCOUNTER
Caller: Alysha Reese    Relationship: Self    Best call back number:     What is the best time to reach you: ANY     Who are you requesting to speak with (clinical staff, provider,  specific staff member): CLINICAL    What was the call regarding: PATIENT NEEDS A PRESCRIPTION FOR HER ORTHOTICS     Do you require a callback: YES

## 2023-02-24 NOTE — TELEPHONE ENCOUNTER
I called patient, she would like Orthotic script to go to [facility]. I faxed order and demographics to [facility] in [place].  Nadine Tobar RT (R), ROT

## 2023-04-12 ENCOUNTER — OFFICE VISIT (OUTPATIENT)
Dept: ORTHOPEDIC SURGERY | Facility: CLINIC | Age: 58
End: 2023-04-12
Payer: COMMERCIAL

## 2023-04-12 VITALS
BODY MASS INDEX: 37.17 KG/M2 | HEIGHT: 67 IN | DIASTOLIC BLOOD PRESSURE: 100 MMHG | WEIGHT: 236.8 LBS | SYSTOLIC BLOOD PRESSURE: 154 MMHG

## 2023-04-12 DIAGNOSIS — I87.8 VENOUS STASIS: ICD-10-CM

## 2023-04-12 DIAGNOSIS — M19.072 ARTHRITIS OF BOTH FEET: Primary | ICD-10-CM

## 2023-04-12 DIAGNOSIS — E66.09 CLASS 2 OBESITY DUE TO EXCESS CALORIES WITHOUT SERIOUS COMORBIDITY WITH BODY MASS INDEX (BMI) OF 37.0 TO 37.9 IN ADULT: ICD-10-CM

## 2023-04-12 DIAGNOSIS — M19.071 ARTHRITIS OF BOTH FEET: Primary | ICD-10-CM

## 2023-04-12 RX ORDER — OMEPRAZOLE 20 MG/1
CAPSULE, DELAYED RELEASE ORAL
COMMUNITY
Start: 2023-02-06

## 2023-04-12 RX ORDER — LISINOPRIL 10 MG/1
TABLET ORAL
COMMUNITY
Start: 2023-02-06

## 2023-04-12 NOTE — PROGRESS NOTES
ESTABLISHED PATIENT    Patient: Alysha Reese  : 1965    Primary Care Provider: Frederick Beck MD    Requesting Provider: As above    Follow-up (2 year 3 month follow up -- Arthritis of both feet )      History    Chief Complaint: Bilateral foot pain    History of Present Illness: This is an extremely pleasant 57-year-old woman who I have seen for midfoot arthritis, metatarsus adductus, neuropathy and venous stasis.  She is here for follow-up of all of these.  She also has gained weight since last visit.  We discussed various measures including Ozempic.  She notes that the neuropathy is slowly getting worse.  She is also having leg cramps.  I suggested that she discuss using either Elavil or Neurontin at bedtime with her internist.  She needs a new prescription for orthotics    Current Outpatient Medications on File Prior to Visit   Medication Sig Dispense Refill   • ADVAIR DISKUS 250-50 MCG/DOSE DISKUS      • fexofenadine (ALLEGRA) 60 MG tablet Take 1 tablet by mouth Daily.     • fluticasone (FLONASE) 50 MCG/ACT nasal spray      • lisinopril (PRINIVIL,ZESTRIL) 10 MG tablet      • loperamide (IMODIUM) 2 MG capsule Take 1 capsule by mouth 4 (Four) Times a Day As Needed for Diarrhea.     • omeprazole (priLOSEC) 20 MG capsule      • [DISCONTINUED] cetirizine (zyrTEC) 10 MG tablet Take 10 mg by mouth Daily. (Patient not taking: Reported on 2023)     • [DISCONTINUED] Cholecalciferol (VITAMIN D3 PO) Take  by mouth. (Patient not taking: Reported on 2023)     • [DISCONTINUED] diclofenac (VOLTAREN) 1 % gel gel Apply 4 g topically to the appropriate area as directed 4 (Four) Times a Day As Needed (foot arthritis). (Patient not taking: Reported on 2023) 5 tube 5   • [DISCONTINUED] meloxicam (MOBIC) 15 MG tablet  (Patient not taking: Reported on 2023)     • [DISCONTINUED] Omega-3 Fatty Acids (OMEGA 3 500 PO) Take  by mouth. (Patient not taking: Reported on 2023)     • [DISCONTINUED]  "triamterene-hydrochlorothiazide (MAXZIDE-25) 37.5-25 MG per tablet Take 1 tablet by mouth Daily. (Patient not taking: Reported on 4/12/2023)       No current facility-administered medications on file prior to visit.      Allergies   Allergen Reactions   • Clindamycin/Lincomycin Rash      Past Medical History:   Diagnosis Date   • Asthma    • Osteoarthritis      Past Surgical History:   Procedure Laterality Date   • CARPAL TUNNEL RELEASE Left 09/2021   • CARPAL TUNNEL RELEASE Right 12/2021   • LEG SURGERY Left 1992    Vein Stripping      Family History   Problem Relation Age of Onset   • Hypertension Mother    • Stroke Father    • Hypertension Father    • Heart attack Father       Social History     Socioeconomic History   • Marital status:    Tobacco Use   • Smoking status: Never   • Smokeless tobacco: Never   Vaping Use   • Vaping Use: Never used   Substance and Sexual Activity   • Alcohol use: Yes     Comment: Socially    • Drug use: No   • Sexual activity: Defer        Review of Systems   Constitutional: Negative.    HENT: Negative.    Eyes: Negative.    Respiratory: Negative.    Cardiovascular: Negative.    Gastrointestinal: Negative.    Endocrine: Negative.    Genitourinary: Negative.    Musculoskeletal: Positive for arthralgias.   Skin: Negative.    Allergic/Immunologic: Negative.    Neurological: Negative.    Hematological: Negative.    Psychiatric/Behavioral: Negative.        The following portions of the patient's history were reviewed and updated as appropriate: allergies, current medications, past family history, past medical history, past social history, past surgical history and problem list.    Physical Exam:   /100   Ht 170.2 cm (67.01\")   Wt 107 kg (236 lb 12.8 oz)   BMI 37.08 kg/m²   GENERAL: Body habitus: morbidly obese    Lower extremity edema: Left: trace; Right: trace    Gait: antalgic with the first few steps     Mental Status:  awake and alert; oriented to person, place, and " time  MSK:  Bilateral metatarsus adductus, midfoot osteophytes, she is still intact to the [place] Trace fiber, DP and posterior tib pulses are 2+  Medical Decision Making    Data Review:   ordered and reviewed x-rays today    Assessment/Plan/Diagnosis/Treatment Options:   1. Arthritis of both feet  She needs new orthotics and I gave her prescription.  I will be  - XR Foot 2 View Bilateral    2. Class 2 obesity due to excess calories without serious comorbidity with body mass index (BMI) of 37.0 to 37.9 in adult  We discussed weight loss I suggest she consider Ozempic    3. Venous stasis  Definitely continue to wear compression socks    4.  Neuropathy-as above I suggested that she discuss those medicines with her internist    Malka Decker MD      Addendum: 4/21/2023: The patient has been referred to [facility] for bilateral custom foot orthotics.  She has significant arthritis in both feet and ankles.  She needs bilateral custom orthotics to provide medial and lateral and longitudinal support, to align the joint axes, and to offload the fifth metatarsal.  She needs these orthotics to provide shock absorption to reduce pain and prevent abnormal gait.  She needs new custom orthotics because her prior orthotics are damaged and are not repairable.  The orthotics need to support the feet and ankles in 3 planes, and they are needed for lifetime.

## 2023-04-12 NOTE — LETTER
2023     Frederick Beck MD  [address]  [facility] [address]    Patient: Alysha Reese   YOB: 1965   Date of Visit: 2023       Dear Dr. Ebony MD:    Thank you for referring Alysha Reese to me for evaluation. Below are the relevant portions of my assessment and plan of care.    If you have questions, please do not hesitate to call me. I look forward to following Alysha along with you.         Sincerely,        Malka Huitron MD        CC: No Recipients    Malka Huitron MD  [id number]  Signed  ESTABLISHED PATIENT    Patient: Alysha Reese  : 1965    Primary Care Provider: Frederick Beck MD    Requesting Provider: As above    Follow-up (2 year 3 month follow up -- Arthritis of both feet )      History    Chief Complaint: Bilateral foot pain    History of Present Illness: This is an extremely pleasant 57-year-old woman who I have seen for midfoot arthritis, metatarsus adductus, neuropathy and venous stasis.  She is here for follow-up of all of these.  She also has gained weight since last visit.  We discussed various measures including Ozempic.  She notes that the neuropathy is slowly getting worse.  She is also having leg cramps.  I suggested that she discuss using either Elavil or Neurontin at bedtime with her internist.  She needs a new prescription for orthotics    Current Outpatient Medications on File Prior to Visit   Medication Sig Dispense Refill   • ADVAIR DISKUS 250-50 MCG/DOSE DISKUS      • fexofenadine (ALLEGRA) 60 MG tablet Take 1 tablet by mouth Daily.     • fluticasone (FLONASE) 50 MCG/ACT nasal spray      • lisinopril (PRINIVIL,ZESTRIL) 10 MG tablet      • loperamide (IMODIUM) 2 MG capsule Take 1 capsule by mouth 4 (Four) Times a Day As Needed for Diarrhea.     • omeprazole (priLOSEC) 20 MG capsule      • [DISCONTINUED] cetirizine (zyrTEC) 10 MG tablet Take 10 mg by mouth Daily. (Patient not taking: Reported on 2023)     •  [DISCONTINUED] Cholecalciferol (VITAMIN D3 PO) Take  by mouth. (Patient not taking: Reported on 4/12/2023)     • [DISCONTINUED] diclofenac (VOLTAREN) 1 % gel gel Apply 4 g topically to the appropriate area as directed 4 (Four) Times a Day As Needed (foot arthritis). (Patient not taking: Reported on 4/12/2023) 5 tube 5   • [DISCONTINUED] meloxicam (MOBIC) 15 MG tablet  (Patient not taking: Reported on 4/12/2023)     • [DISCONTINUED] Omega-3 Fatty Acids (OMEGA 3 500 PO) Take  by mouth. (Patient not taking: Reported on 4/12/2023)     • [DISCONTINUED] triamterene-hydrochlorothiazide (MAXZIDE-25) 37.5-25 MG per tablet Take 1 tablet by mouth Daily. (Patient not taking: Reported on 4/12/2023)       No current facility-administered medications on file prior to visit.      Allergies   Allergen Reactions   • Clindamycin/Lincomycin Rash      Past Medical History:   Diagnosis Date   • Asthma    • Osteoarthritis      Past Surgical History:   Procedure Laterality Date   • CARPAL TUNNEL RELEASE Left 09/2021   • CARPAL TUNNEL RELEASE Right 12/2021   • LEG SURGERY Left 1992    Vein Stripping      Family History   Problem Relation Age of Onset   • Hypertension Mother    • Stroke Father    • Hypertension Father    • Heart attack Father       Social History     Socioeconomic History   • Marital status:    Tobacco Use   • Smoking status: Never   • Smokeless tobacco: Never   Vaping Use   • Vaping Use: Never used   Substance and Sexual Activity   • Alcohol use: Yes     Comment: Socially    • Drug use: No   • Sexual activity: Defer        Review of Systems   Constitutional: Negative.    HENT: Negative.    Eyes: Negative.    Respiratory: Negative.    Cardiovascular: Negative.    Gastrointestinal: Negative.    Endocrine: Negative.    Genitourinary: Negative.    Musculoskeletal: Positive for arthralgias.   Skin: Negative.    Allergic/Immunologic: Negative.    Neurological: Negative.    Hematological: Negative.   "  Psychiatric/Behavioral: Negative.        The following portions of the patient's history were reviewed and updated as appropriate: allergies, current medications, past family history, past medical history, past social history, past surgical history and problem list.    Physical Exam:   /100   Ht 170.2 cm (67.01\")   Wt 107 kg (236 lb 12.8 oz)   BMI 37.08 kg/m²   GENERAL: Body habitus: morbidly obese    Lower extremity edema: Left: trace; Right: trace    Gait: antalgic with the first few steps     Mental Status:  awake and alert; oriented to person, place, and time  MSK:  Bilateral metatarsus adductus, midfoot osteophytes, she is still intact to the [place] Trace fiber, DP and posterior tib pulses are 2+  Medical Decision Making    Data Review:   ordered and reviewed x-rays today    Assessment/Plan/Diagnosis/Treatment Options:   1. Arthritis of both feet  She needs new orthotics and I gave her prescription.  I will be  - XR Foot 2 View Bilateral    2. Class 2 obesity due to excess calories without serious comorbidity with body mass index (BMI) of 37.0 to 37.9 in adult  We discussed weight loss I suggest she consider Ozempic    3. Venous stasis  Definitely continue to wear compression socks    4.  Neuropathy-as above I suggested that she discuss those medicines with her internist    Malka Decker MD                        "